# Patient Record
Sex: FEMALE | Race: AMERICAN INDIAN OR ALASKA NATIVE
[De-identification: names, ages, dates, MRNs, and addresses within clinical notes are randomized per-mention and may not be internally consistent; named-entity substitution may affect disease eponyms.]

---

## 2017-10-10 ENCOUNTER — HOSPITAL ENCOUNTER (OUTPATIENT)
Dept: HOSPITAL 5 - SPVWC | Age: 57
Discharge: HOME | End: 2017-10-10
Attending: FAMILY MEDICINE
Payer: COMMERCIAL

## 2017-10-10 DIAGNOSIS — M85.88: ICD-10-CM

## 2017-10-10 DIAGNOSIS — Z12.31: Primary | ICD-10-CM

## 2017-10-10 PROCEDURE — 77067 SCR MAMMO BI INCL CAD: CPT

## 2017-10-10 PROCEDURE — G0202 SCR MAMMO BI INCL CAD: HCPCS

## 2017-10-10 PROCEDURE — 77080 DXA BONE DENSITY AXIAL: CPT

## 2017-10-10 NOTE — MAMMOGRAPHY REPORT
BILATERAL MAMMOGRAM with CAD:



HISTORY:

Digital screening. Comparison study is dated October 10, 2016.



FINDINGS:

The breast tissue is heterogeneously dense, which could obscure

detection of small masses (approximately 50%-75% glandular).  No mass, 

distortion, suspicious calcification, or skin change is seen.



IMPRESSION:

Negative mammogram.  There is no mammographic evidence of

malignancy.



RECOMMENDATION:

Follow-up per ACS guidelines.



BI-RADS CATEGORY:  1 = Negative



ACR BI-RADS MAMMOGRAPHIC CODES:

0 = Needs additional imaging evaluation; 1 = Negative; 2 = Benign; 3 = 

Probably benign; 4 = Suspicious; 5 = Malignant; 6 = Known biopsy-proven 

malignancy



COMMENT:

      1.   Dense breast tissue, i.e., adenosis, fibrocystic 

            changes, etc., may obscure an underlying neoplasm.

      2.   Approximately 10% of cancers are not detected with

            mammography.

      3.   A negative mammography report should not delay biopsy 

            if a clinically suspicious mass is present.



COMMENT:

Patient follow-up letters are generated in [address]

## 2017-10-10 NOTE — MAMMOGRAPHY REPORT
BONE DENSITY STUDY:



DEFINITIONS:

  BMD     = Bone Mineral Density

  T-score = BMD related to mean peak bone mass of young adult

            (mean expressed in Standard Deviation)

  Z-score = Age matched BMD expressed in SD



World Health Organization (WHO) Diagnostic Criteria

  Normal             T-score > -1 SD

  Osteopenia      T-score between -1 and -2.4 SD

  Osteoporosis    T-score -2.5 SD or below



FINDINGS:

The weighted average BMD of lumbar spine L1-L4 is 0.807 with a

T-score of -2.2. Compared to the previous study performed on October 9, 2015, this represents an interval change of 3.8%.



The weighted average BMD of hip is 0.688 with a T-score of -2.1. This 

represents an interval change of 5.6%.

IMPRESSION:

The patient's T-score is diagnostic for osteopenia and average

relative risk for fracture.



NOTE:

BMD is not the only risk factor for fracture; also consider

factors such as the patient's age, risk of falling, previous

osteoporotic fracture, family history of osteoporotic fractures, 

current smoker, and low body weight.



Neil's triangle is a region of interest in femur, predominantly

of trabecular bone.  It is not a true anatomic site, and [facility] does not 

recommend its use clinically.

## 2018-10-11 ENCOUNTER — HOSPITAL ENCOUNTER (OUTPATIENT)
Dept: HOSPITAL 5 - SPVWC | Age: 58
Discharge: HOME | End: 2018-10-11
Attending: FAMILY MEDICINE
Payer: COMMERCIAL

## 2018-10-11 DIAGNOSIS — Z12.31: Primary | ICD-10-CM

## 2018-10-11 PROCEDURE — 77067 SCR MAMMO BI INCL CAD: CPT

## 2018-10-11 NOTE — MAMMOGRAPHY REPORT
BILATERAL DIGITAL SCREENING MAMMOGRAM with CAD: 10/11/18 08:01:00



CLINICAL: Routine screening.



COMPARISON:10/10/17



FINDINGS: The breasts are heterogeneously dense, which may obscure 

small masses. No mass, architectural distortion or suspicious 

calcifications.



IMPRESSION: No mammographic evidence of malignancy.



BI-RADS CATEGORY: 1 - - Negative



RECOMMENDATION: Routine mammographic screening in one year.





COMMENT:

Patient follow-up letters are generated by our [employer] application.

## 2019-10-16 NOTE — MAMMOGRAPHY REPORT
DIGITAL SCREENING MAMMOGRAM WITH CAD, 10/14/2019



INDICATION: Routine screening mammography. 



TECHNIQUE:  Digital bilateral  2D mammography was obtained in the craniocaudal and mediolateral obliq
ue projections. This examination was interpreted with the benefit of Computer-Aided Detection analysi
s.



COMPARISON: 10/11/2018



FINDINGS: 



Breast Density: The breasts are heterogeneously dense, which may obscure small masses.



There is no evidence of dominant mass, suspicious calcifications or architectural distortion in eithe
r breast.



IMPRESSION: No mammographic evidence of malignancy.



Follow up recommendation: Routine yearly



BI-RADS Category 1:  Negative.



A "normal" or negative report should not discourage follow up or biopsy of a clinically significant f
inding.



A written summary of these findings will be mailed to the patient. The patient will be entered into a
 mammography reporting system which will generate a reminder letter for the patient's next appointmen
t at the appropriate interval.



The American College of Radiology recommends yearly mammograms starting at age 40 and continuing as l
paxton as a woman is in good health.  Breast MRI is recommended for women with an approximate 20-25% or 
greater lifetime risk of breast cancer, including women with a strong family history of breast or ova
d[place] cancer or who have been treated for Hodgkin's disease.



Signer Name: Mg Aguilera MD 

Signed: 10/16/2019 11:29 AM

 Workstation Name: [id number]

## 2019-10-16 NOTE — MAMMOGRAPHY REPORT
BONE DEXA



CLINICAL: Postmenopausal.



COMPARISON: 10/10/2017



TECHNIQUE: 2 site bone DEXA performed on an Hologic scanner.



FINDINGS:



The average BMD of the lumbar spine L1-L4 is 0.820g/cm squared with a T score of -2.1 and a Z score o
f -0.7. This compares to 0.807g/cm squared on the last exam and represents a +1.6 % change from the [
previous baseline].



The average BMD of the left hip is 0.692 g/cm squared with a T score of -2.1and a Z score of -1.2. Th
is compares to 0.688 g/cm squared on the last exam and represents a +0.5 % change from the [previous 
baseline].



The left femoral neck BMD is 0.488 g/cm squared with a T score of -3.3 and a Z score of -2.0.



IMPRESSION:

1. WHO classification: Osteopenia with increased fracture risk based on spine and total left hip larissa
urements.

2. WHO classification Osteoporosis with high fracture risk based on left femoral neck measurements.

3. A modest improvement in both spine and left hip BMD compared to the previous exam.





RECOMMENDATION: Clinical correlation and routine screening.



Definitions:

BMD equal bone mineral density

T score = BMD related to peak bone mass of young adult (Jeniffer expressed an standard deviation)

Z score = age-matched BMD expressed in SD

World health organization (WHO) diagnostic criteria

Normal T score greater than equal to 1 standard deviation

Osteopenia T score between -1 and -2.4 standard deviation

Osteoporosis T score -2.5 standard deviation or below.



Note: BMD is not the only risk factor for fracture; also consider factors such as the patient's age, 
risk of falling, previous osteoporotic fracture, family history of osteoporotic fractures, current sm
oker and low body weight.



Z scores are not calculated if greater than 80 years of age.



Signer Name: Mg Aguilera MD 

Signed: 10/16/2019 11:36 AM

 Workstation Name: [id number]

## 2020-10-15 NOTE — MAMMOGRAPHY REPORT
DIGITAL SCREENING MAMMOGRAM WITH CAD, 10/15/2020



INDICATION: Routine screening mammography. 



TECHNIQUE:  Digital bilateral  2D mammography was obtained in the craniocaudal and mediolateral obliq
ue projections. This examination was interpreted with the benefit of Computer-Aided Detection analysi
s.



COMPARISON: 10/14/2019, 10/11/2018



FINDINGS: 



Breast Density: The breasts are heterogeneously dense, which may obscure small masses.



There is no evidence of dominant mass, suspicious calcifications or architectural distortion in eithe
r breast.



IMPRESSION:



Follow up recommendation: Routine yearly



BI-RADS Category 1:  Negative.



A "normal" or negative report should not discourage follow up or biopsy of a clinically significant f
inding.



A written summary of these findings will be mailed to the patient. The patient will be entered into a
 mammography reporting system which will generate a reminder letter for the patient's next appointmen
t at the appropriate interval.



The American College of Radiology recommends yearly mammograms starting at age 40 and continuing as l
paxton as a woman is in good health.  Breast MRI is recommended for women with an approximate 20-25% or 
greater lifetime risk of breast cancer, including women with a strong family history of breast or ova
[place] cancer or who have been treated for Hodgkin's disease.



Signer Name: Regan Jacobs MD 

Signed: 10/15/2020 11:01 AM

Workstation Name: [id number]

## 2021-10-18 NOTE — MAMMOGRAPHY REPORT
DIGITAL SCREENING MAMMOGRAM WITH CAD, 10/18/2021



CLINICAL INFORMATION / INDICATION: Routine screening mammography. SCREENING MAMMO Z12.31



TECHNIQUE:  Digital bilateral 2D mammography was obtained in the craniocaudal and mediolateral obliqu
e projections. This examination was interpreted with the benefit of Computer-Aided Detection analysis
.



COMPARISON: 10/15/2020



FINDINGS: 



Breast Density: The breasts are heterogeneously dense, which may obscure small masses.



No dominant mass, suspicious calcifications, or architectural distortion in either breast. 





 

IMPRESSION: No mammographic evidence of malignancy.



Follow up recommendation: Routine yearly



BI-RADS Category 1:  Negative.





-------------------------------------------------------------------------------------------

A "normal" or negative report should not discourage follow up or biopsy of a clinically significant f
inding.



A written summary of these findings will be mailed to the patient. The patient will be entered into a
 mammography reporting system which will generate a reminder letter for the patient's next appointmen
t at the appropriate interval.



The American [employer] recommends yearly mammograms starting at age 40 and continuing as l
paxton as a woman is in good health.  Breast MRI is recommended for women with an approximate 20-25% or 
greater lifetime risk of breast cancer, including women with a strong family history of breast or ova
[place] cancer or who have been treated for Hodgkin's disease.



Signer Name: David Tao MD 

Signed: 10/18/2021 11:53 AM

Workstation Name: [id number]